# Patient Record
Sex: MALE | Race: WHITE
[De-identification: names, ages, dates, MRNs, and addresses within clinical notes are randomized per-mention and may not be internally consistent; named-entity substitution may affect disease eponyms.]

---

## 2020-07-02 ENCOUNTER — HOSPITAL ENCOUNTER (EMERGENCY)
Dept: HOSPITAL 26 - MED | Age: 40
Discharge: HOME | End: 2020-07-02
Payer: MEDICAID

## 2020-07-02 VITALS — BODY MASS INDEX: 32.03 KG/M2 | WEIGHT: 192.25 LBS | HEIGHT: 65 IN

## 2020-07-02 VITALS — DIASTOLIC BLOOD PRESSURE: 69 MMHG | SYSTOLIC BLOOD PRESSURE: 146 MMHG

## 2020-07-02 VITALS — SYSTOLIC BLOOD PRESSURE: 146 MMHG | DIASTOLIC BLOOD PRESSURE: 69 MMHG
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
## 2020-07-02 DIAGNOSIS — S62.502B: Primary | ICD-10-CM
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
## 2020-07-02 PROCEDURE — 73140 X-RAY EXAM OF FINGER(S): CPT

## 2020-07-02 PROCEDURE — 99283 EMERGENCY DEPT VISIT LOW MDM: CPT

## 2020-07-02 PROCEDURE — 12001 RPR S/N/AX/GEN/TRNK 2.5CM/<: CPT

## 2020-07-02 NOTE — NUR
Patient discharged with v/s stable. Written and verbal after care instructions 
given and explained. 

Patient alert, oriented and verbalized understanding of instructions. 
Ambulatory with steady gait. All questions addressed prior to discharge. ID 
band removed. Patient advised to follow up with PMD. Rx of KEFLEX 500MG, 
BACITRACIN 500UNIT, AND IBUPROFEN 600MG AND NORCO given. Patient educated on 
indication of medication including possible reaction and side effects. 
Opportunity to ask questions provided and answered.

## 2020-07-02 NOTE — NUR
38 Y/O MALE FROM HOME PRESENTS WITH LACERATION TO LT THUMB. PT STATES HIS HAND 
GOT STUCK IN  1 HR PRIOR TO ARRIVAL. PT DENIES PAIN AT THIS TIME. 
BLEEDING CONTROLLED. UNKNOWN LAST TETANUS. +CMS, +PULSES. SKIN WARM AND DRY. 
VSS



MEDHX: DENIES

## 2025-05-21 NOTE — NUR
Good catch!  I thought I saw those from another specialist, but that was just last year.  Labs updated.  PT PLACED IN HALLWAY BED C.